# Patient Record
Sex: FEMALE | Race: WHITE | URBAN - METROPOLITAN AREA
[De-identification: names, ages, dates, MRNs, and addresses within clinical notes are randomized per-mention and may not be internally consistent; named-entity substitution may affect disease eponyms.]

---

## 2024-03-25 ENCOUNTER — HOSPITAL ENCOUNTER (EMERGENCY)
Age: 74
Discharge: HOME | End: 2024-03-25
Attending: EMERGENCY MEDICINE
Payer: MEDICARE

## 2024-03-25 ENCOUNTER — APPOINTMENT (OUTPATIENT)
Dept: GENERAL RADIOLOGY | Age: 74
End: 2024-03-25
Payer: MEDICARE

## 2024-03-25 VITALS
TEMPERATURE: 98 F | SYSTOLIC BLOOD PRESSURE: 153 MMHG | BODY MASS INDEX: 24.09 KG/M2 | OXYGEN SATURATION: 96 % | DIASTOLIC BLOOD PRESSURE: 84 MMHG | RESPIRATION RATE: 16 BRPM | HEART RATE: 65 BPM | HEIGHT: 66 IN | WEIGHT: 149.91 LBS

## 2024-03-25 DIAGNOSIS — S52.602A CLOSED FRACTURE OF DISTAL ENDS OF LEFT RADIUS AND ULNA, INITIAL ENCOUNTER: Primary | ICD-10-CM

## 2024-03-25 DIAGNOSIS — S52.502A CLOSED FRACTURE OF DISTAL ENDS OF LEFT RADIUS AND ULNA, INITIAL ENCOUNTER: Primary | ICD-10-CM

## 2024-03-25 PROCEDURE — 73110 X-RAY EXAM OF WRIST: CPT

## 2024-03-25 PROCEDURE — 29125 APPL SHORT ARM SPLINT STATIC: CPT

## 2024-03-25 PROCEDURE — 99283 EMERGENCY DEPT VISIT LOW MDM: CPT

## 2024-03-25 PROCEDURE — 6370000000 HC RX 637 (ALT 250 FOR IP): Performed by: EMERGENCY MEDICINE

## 2024-03-25 RX ORDER — HYDROCODONE BITARTRATE AND ACETAMINOPHEN 5; 325 MG/1; MG/1
1 TABLET ORAL EVERY 8 HOURS PRN
Qty: 15 TABLET | Refills: 0 | Status: SHIPPED | OUTPATIENT
Start: 2024-03-25 | End: 2024-03-30

## 2024-03-25 RX ORDER — IBUPROFEN 400 MG/1
400 TABLET ORAL EVERY 8 HOURS PRN
Qty: 15 TABLET | Refills: 0 | Status: SHIPPED | OUTPATIENT
Start: 2024-03-25 | End: 2024-03-30

## 2024-03-25 RX ORDER — HYDROCODONE BITARTRATE AND ACETAMINOPHEN 5; 325 MG/1; MG/1
1 TABLET ORAL ONCE
Status: COMPLETED | OUTPATIENT
Start: 2024-03-25 | End: 2024-03-25

## 2024-03-25 RX ADMIN — HYDROCODONE BITARTRATE AND ACETAMINOPHEN 1 TABLET: 5; 325 TABLET ORAL at 21:26

## 2024-03-25 ASSESSMENT — LIFESTYLE VARIABLES
HOW MANY STANDARD DRINKS CONTAINING ALCOHOL DO YOU HAVE ON A TYPICAL DAY: PATIENT DOES NOT DRINK
HOW OFTEN DO YOU HAVE A DRINK CONTAINING ALCOHOL: NEVER

## 2024-03-25 ASSESSMENT — PAIN DESCRIPTION - ORIENTATION
ORIENTATION: LEFT
ORIENTATION: LEFT

## 2024-03-25 ASSESSMENT — PAIN DESCRIPTION - LOCATION
LOCATION: WRIST
LOCATION: WRIST

## 2024-03-25 ASSESSMENT — PAIN SCALES - GENERAL
PAINLEVEL_OUTOF10: 10
PAINLEVEL_OUTOF10: 8
PAINLEVEL_OUTOF10: 8

## 2024-03-25 ASSESSMENT — PAIN - FUNCTIONAL ASSESSMENT
PAIN_FUNCTIONAL_ASSESSMENT: 0-10
PAIN_FUNCTIONAL_ASSESSMENT: 0-10

## 2024-03-25 ASSESSMENT — PAIN DESCRIPTION - DESCRIPTORS: DESCRIPTORS: DISCOMFORT

## 2024-03-26 ENCOUNTER — TELEPHONE (OUTPATIENT)
Dept: ORTHOPEDIC SURGERY | Age: 74
End: 2024-03-26

## 2024-03-26 NOTE — TELEPHONE ENCOUNTER
Appointment Request     Patient requesting earlier appointment: Yes  Appointment offered to patient: YES  Patient Contact Number:  [phone]     DAUGHTER, NATASHA CALLING FOR A SOONER APPOINTMENT.    HER MOTHER WAS IN THE [facility] LAST EVENING, MARCH 25, 2024.    PATIENT HAS A LT WRIST INJURY.    DAUGHTER STATED THAT HER MOTHER IS IN A LOT OF PAIN, AND IF SHE CAN SPEAK TO SOMEONE REGARDING WHAT SHE CAN DO UNTIL HER APPOINTMENT TOMORROW.    PLEASE CALL BACK NATASHA AT THE ABOVE NUMBER.

## 2024-03-26 NOTE — ED PROVIDER NOTES
CHIEF COMPLAINT  Chief Complaint   Patient presents with    Wrist Injury     Presents w c/o slipping and fell backward injured left wrist apprx 1 hr ago/ here from [place] visiting family/ denies any head injury       HISTORY OF PRESENT ILLNESS  Alix Baker is a 73 y.o. female who presents to the ED complaining of fall on outstretched hand with complaints of pain over the left distal wrist.  No paresthesia.  Patient denies any other injury to the head, neck, back, pelvis or lower extremities.  No arm weakness.  Patient reports no pain of the left shoulder, elbow or hand.  Patient reports pain and swelling over the left distal radius and ulna    No other complaints, modifying factors or associated symptoms.     Nursing notes reviewed.   Past Medical History:   Diagnosis Date    Hyperlipidemia      Past Surgical History:   Procedure Laterality Date    CARPAL TUNNEL RELEASE       History reviewed. No pertinent family history.  Social History     Socioeconomic History    Marital status:      Spouse name: Not on file    Number of children: Not on file    Years of education: Not on file    Highest education level: Not on file   Occupational History    Not on file   Tobacco Use    Smoking status: Never     Passive exposure: Never    Smokeless tobacco: Never   Vaping Use    Vaping Use: Never used   Substance and Sexual Activity    Alcohol use: Never    Drug use: Never    Sexual activity: Defer   Other Topics Concern    Not on file   Social History Narrative    Not on file     Social Determinants of Health     Financial Resource Strain: Not on file   Food Insecurity: Not on file   Transportation Needs: Not on file   Physical Activity: Not on file   Stress: Not on file   Social Connections: Not on file   Intimate Partner Violence: Not on file   Housing Stability: Not on file     No current facility-administered medications for this encounter.     Current Outpatient Medications   Medication Sig Dispense Refill

## 2024-03-26 NOTE — TELEPHONE ENCOUNTER
Spoke with patient's daughter Elton.     Advised patient is scheduled in our first available slot. She understood.   She states they have not picked up the pain medication yet but will do so today.    No further questions

## 2024-03-27 ENCOUNTER — OFFICE VISIT (OUTPATIENT)
Dept: ORTHOPEDIC SURGERY | Age: 74
End: 2024-03-27
Payer: MEDICARE

## 2024-03-27 VITALS — HEIGHT: 66 IN | BODY MASS INDEX: 23.95 KG/M2 | WEIGHT: 149 LBS | RESPIRATION RATE: 16 BRPM

## 2024-03-27 DIAGNOSIS — S52.502A CLOSED FRACTURE OF DISTAL END OF LEFT RADIUS, UNSPECIFIED FRACTURE MORPHOLOGY, INITIAL ENCOUNTER: Primary | ICD-10-CM

## 2024-03-27 PROCEDURE — G8400 PT W/DXA NO RESULTS DOC: HCPCS | Performed by: NURSE PRACTITIONER

## 2024-03-27 PROCEDURE — 1123F ACP DISCUSS/DSCN MKR DOCD: CPT | Performed by: NURSE PRACTITIONER

## 2024-03-27 PROCEDURE — G8420 CALC BMI NORM PARAMETERS: HCPCS | Performed by: NURSE PRACTITIONER

## 2024-03-27 PROCEDURE — 3017F COLORECTAL CA SCREEN DOC REV: CPT | Performed by: NURSE PRACTITIONER

## 2024-03-27 PROCEDURE — G8428 CUR MEDS NOT DOCUMENT: HCPCS | Performed by: NURSE PRACTITIONER

## 2024-03-27 PROCEDURE — 1036F TOBACCO NON-USER: CPT | Performed by: NURSE PRACTITIONER

## 2024-03-27 PROCEDURE — G8484 FLU IMMUNIZE NO ADMIN: HCPCS | Performed by: NURSE PRACTITIONER

## 2024-03-27 PROCEDURE — 1090F PRES/ABSN URINE INCON ASSESS: CPT | Performed by: NURSE PRACTITIONER

## 2024-03-27 PROCEDURE — 99203 OFFICE O/P NEW LOW 30 MIN: CPT | Performed by: NURSE PRACTITIONER

## 2024-03-27 NOTE — PROGRESS NOTES
Ms. Alix Baker is a 73 y.o. right handed woman  who is seen today in Hand Surgical Consultation.    She is seen today regarding an injury occurring on March 25th, 2024.  She reports injuring her left wrist, having Fallen  when getting a box out of a truck.  She was seen for Emergency evaluation elsewhere where radiographs were obtained & she was immobilized.  By report, there  was not an associated skin injury.  She reports moderate pain located in the  Radial and Ulnar aspect of the distal forearm & wrist, no tenderness throughout the hand or elbow.  She notes today, no neurologic symptoms in the Whole Hand. Symptoms show no change over time. Patient is from [place] and tentatively plans to fly back home next Friday, 4/5.    I have today reviewed with Alix Baker the clinically relevant, past medical history, medications, allergies,  family history, social history, and Review Of Systems & I have documented any details relevant to today's presenting complaints in my history above.  Ms. Alix Baker's self-reported past medical history, medications, allergies,  family history, social history, and Review Of Systems have been scanned into the chart under the \"Media\" tab.    Physical Exam:  Ms. Alix Baker's most recent vitals:  Vitals  Respirations: 16  Height: 167.6 cm (5' 6\")  Weight - Scale: 67.6 kg (149 lb)    She is well nourished, oriented to person, place & time.  She demonstrates appropriate mood and affect as well as normal gait and station.    Skin: Normal Color, Normal Texture, Free of Lesions, and moderate Ecchymosis in the injured limb, normal on the contralateral side  Digital range of motion is without significant limitation on the Left, normal on the Right  Wrist range of motion is limited by pain and not able to be evaluated due to the associated injury on the Left, normal on the Right  Sensation is subjectively normal and present in the Whole Hand, other digits are normally sensate bilaterally